# Patient Record
Sex: FEMALE | Race: WHITE | Employment: OTHER | URBAN - METROPOLITAN AREA
[De-identification: names, ages, dates, MRNs, and addresses within clinical notes are randomized per-mention and may not be internally consistent; named-entity substitution may affect disease eponyms.]

---

## 2021-04-26 ENCOUNTER — OFFICE VISIT (OUTPATIENT)
Dept: NEUROLOGY | Age: 26
End: 2021-04-26
Payer: MEDICAID

## 2021-04-26 VITALS — RESPIRATION RATE: 20 BRPM | SYSTOLIC BLOOD PRESSURE: 118 MMHG | DIASTOLIC BLOOD PRESSURE: 88 MMHG

## 2021-04-26 DIAGNOSIS — R41.89 COGNITIVE IMPAIRMENT: Primary | ICD-10-CM

## 2021-04-26 DIAGNOSIS — F80.81 STUTTERING: ICD-10-CM

## 2021-04-26 DIAGNOSIS — F41.9 ANXIETY AND DEPRESSION: ICD-10-CM

## 2021-04-26 DIAGNOSIS — R20.2 PARESTHESIA: ICD-10-CM

## 2021-04-26 DIAGNOSIS — M54.81 BILATERAL OCCIPITAL NEURALGIA: ICD-10-CM

## 2021-04-26 DIAGNOSIS — R53.82 CHRONIC FATIGUE: ICD-10-CM

## 2021-04-26 DIAGNOSIS — R29.2 HYPERREFLEXIA: ICD-10-CM

## 2021-04-26 DIAGNOSIS — F32.A ANXIETY AND DEPRESSION: ICD-10-CM

## 2021-04-26 DIAGNOSIS — G90.A POTS (POSTURAL ORTHOSTATIC TACHYCARDIA SYNDROME): ICD-10-CM

## 2021-04-26 PROCEDURE — 99205 OFFICE O/P NEW HI 60 MIN: CPT | Performed by: PSYCHIATRY & NEUROLOGY

## 2021-04-26 RX ORDER — METFORMIN HYDROCHLORIDE 500 MG/1
1500 TABLET ORAL DAILY
COMMUNITY

## 2021-04-26 RX ORDER — ALPRAZOLAM 0.25 MG/1
0.25 TABLET ORAL AS NEEDED
COMMUNITY

## 2021-04-26 RX ORDER — LISINOPRIL 20 MG/1
20 TABLET ORAL DAILY
COMMUNITY

## 2021-04-26 NOTE — PROGRESS NOTES
NEUROLOGY CLINIC NOTE    Patient ID:  Luci Springer  [id number]  87 y.o.  1995    Date of Consultation:  April 26, 2021    Reason for Consultation: question of multiple sclerosis    Chief Complaint   Patient presents with    New Patient     MS? History of Present Illness: There are no active problems to display for this patient. Past Medical History:   Diagnosis Date    Anxiety     Depression     High cholesterol     Hypertension     NAFL (nonalcoholic fatty liver)     PCOS (polycystic ovarian syndrome)     POTS (postural orthostatic tachycardia syndrome)       Past Surgical History:   Procedure Laterality Date    HX WISDOM TEETH EXTRACTION        Prior to Admission medications    Medication Sig Start Date End Date Taking? Authorizing Provider   fludrocortisone acetate (FLUDROCORTISONE PO) Take 0.1 mg by mouth daily. Yes Provider, Historical   METOPROLOL SUCCINATE PO Take 25 mg by mouth daily. Yes Provider, Historical   BUPROPION HCL PO Take 450 mg by mouth daily. Yes Provider, Historical   fluoxetine HCl (FLUOXETINE PO) Take 80 mg by mouth daily. Yes Provider, Historical   ALPRAZolam (XANAX) 0.25 mg tablet Take 0.25 mg by mouth as needed for Anxiety. Yes Provider, Historical   metFORMIN (GLUCOPHAGE) 500 mg tablet Take 1,500 mg by mouth daily. Yes Provider, Historical   colesevelam HCl (COLESEVELAM PO) Take 625 mg by mouth two (2) times a day. Yes Provider, Historical   naltrexone HCl (NALTREXONE PO) Take 4.5 mg by mouth daily. Yes Provider, Historical   lisinopriL (PRINIVIL, ZESTRIL) 20 mg tablet Take 20 mg by mouth daily. Yes Provider, Historical   semaglutide (OZEMPIC SC) by SubCUTAneous route.    Yes Provider, Historical     Allergies   Allergen Reactions    Sulfa (Sulfonamide Antibiotics) Rash      Social History     Tobacco Use    Smoking status: Not on file   Substance Use Topics    Alcohol use: Not on file      Family History   Problem Relation Age of Onset    Heart Disease Father     Hypertension Sister     Depression Sister     Anxiety Sister     Bipolar Disorder Sister     Heart Disease Other         Subjective:      Juice Sahh is a 30 y.o. obese RHWF with history of anxiety, depression, OCD, PTSD, PCOS, HTN, hypercholesterolemia and POTS who is here for further evaluation for a concern for multiple sclerosis. Patient is being seen by Dr. Lionel Ruelas for POTS. She is also under the care of psychiatrist and therapist. Previously saw a neurologist back in high school for joint and whole body pain and difficulty holding on to things. Patient provided a list of her concerns that has been ongoing for months to years:  Patient complaining of exercise and heat intolerance  Shortness of breath with activity  Electrical sensation base of the neck and shoots up in the head  Rapid heartbeat  Dizziness precipitated with her head tilted back  Seeing white spots in her field of vision or sparkles or bigger black spots. Hallucinations around sleep. Sense of weakness. Restless legs. Stuttering when speaking. Sensitivity to light, sound, touch and smells. 90 pound weight gain in 2 years. Episodes of numbing sensation in the tongue. Feels like the ground is tilted sideways sometimes. Issues with difficulty remembering things. Periods of confusion. Spells when she does not recognize faces, numbers her words. Feels like she is in a fog. Full feeling in her throat and tongue and sometimes hard to swallow. Joint pain. Outside reports reviewed: none.     Review of Systems:    A comprehensive review of systems was performed:   Constitutional: positive for weight gain,fatigue  Eyes: positive for vision problems  Ears, nose, mouth, throat, and face: positive for none  Respiratory: positive for shortness of breath  Cardiovascular: positive for high blood pressure  Gastrointestinal: positive for none  Genitourinary: positive for none  Integument/breast: positive for none  Hematologic/lymphatic: positive for none  Musculoskeletal: positive for weakness, joint pain  Neurological: positive for memory loss  Behavioral/Psych: positive for anxiety, depression  Endocrine: positive for none  Allergic/Immunologic: positive for none      Objective:     Visit Vitals  /88   Resp 20       PHYSICAL EXAM:    General Appearance: Alert, patient appears stated age. General:  Obese, patient in no apparent distress. Head/Face: The head is normocephalic and atraumatic. Eyes: Conjunctivae appear normal. Sclera appear normal and non-icteric. Nose (and Sinus):   No abnormality of the nose or sinuses is noted. Oral:   Throat is clear. Lymphatics:  No lymphadenopathy in the neck/head. Neck and Thyroid:   No bruits noted in the neck. Respiratory:  Lungs clear to auscultation. Cardiovascular:  Palpation and auscultation: regular rate and rhythm. Extremity: No joint swelling, erythema or pedal edema. NEUROLOGICAL EXAM:    Appearance: The patient is obese, provides a coherent history and is in no acute distress. Mental Status: Oriented to time, place and person. Fluent, no aphasia or dysarthria. Mood and affect appropriate. Cranial Nerves:   Intact visual fields. VA 20/20 OD, 20/25 OS on near vision without correction. Fundi are benign. TUNDE, EOM's full, no nystagmus, no ptosis. Facial sensation is normal. Corneal reflexes are intact. Facial movement is symmetric. Hearing is normal bilaterally. Palate is midline with normal elevation. Sternocleidomastoid and trapezius muscles are normal. Tongue is midline. Motor:  5/5 strength in upper and lower proximal and distal muscles. Normal bulk and tone. No fasciculations. No pronator drift. Reflexes:   Deep tendon reflexes 3+/4 and symmetrical. Downgoing toes. Sensory:   Normal to cold, pinprick and vibration. Gait:  Normal gait. No Romberg. Can do tandem walking. Tremor:   No tremor noted.    Cerebellar:  Intact FTN/ANEUDY/HTS. Neurovascular:  Normal heart sounds and regular rhythm, peripheral pulses intact, and no carotid bruits. Assessment:   Cognitive impairment  Paresthesia  Chronic fatigue   Occipital neuralgia  Hyperreflexia  Stuttering  POTS  Anxiety/depression    Plan:   Neurological examination mainly reveals symmetric hyperreflexia but no focal findings. Patient has multiple issues that concerning for possible central demyelinating disease process. Given her age also need to consider congenital anomalies. Brain MRI with and without contrast was ordered to further assess. Discussed possible neuropsychological testing as well. Further intervention be done pending results of testing. Patient with widespread paresthesias with no clear glove stocking sensory deficit on exam.  Need to look for central process. Patient with chronic fatigue, may be related to history of POTS as well has given her body habitus potential issues with sleep apnea. Could also be central in etiology. Patient on exam with elements of occipital neuralgia due to poor anterior head posture. Likely cause of that electrical shock feeling from the base of her neck to the top of her head. Advised to correct her anterior head posture. Use headrest at home and while driving. Use wireless headsets. Do not carry anterior shoulder. Use only one pillow when sleeping at most.    Exam reveals issues with hyperreflexia. Need to rule out central demyelinating process. Patient with intermittent issues with stuttering. Not evident currently. Need to consider central process versus psychological.    Patient with known history of POTS. Certainly the constellation of her symptoms can be explained by this. Continue care with specialist.    Patient with significant issues with anxiety and depression. May benefit from neuropsychological testing.   Continue care with psychiatrist and therapist.  All questions and concerns were answered. Visit lasted 65 minutes. Greater than 50% was spent reviewing the list that she provided over symptomatologies, discussion about her condition, potential etiology, prognosis, need for brain MRI with and without contrast to assess for possible central demyelinating disease or congenital anomalies, issues may be related to POTS, also related to her significant psychiatric issues, possible need for neuropsychological testing    This note was created using voice recognition software. Despite editing, there may be syntax errors.

## 2021-04-26 NOTE — PROGRESS NOTES
MS- electric feeling that goes in the back of her neck into the back of her head   Heat and exercise intolerance   Cognitive decline   She always has the feeling that her limbs are weak and feel heavy     Gotten worse in the last 3 years, she knows she had some of these during high school

## 2021-04-26 NOTE — LETTER
4/27/2021 Patient: Adia Watson YOB: 1995 Date of Visit: 4/26/2021 Alessandra Yoder MD 
[address] [facility] [phone] [address] Fax: [phone] Dear Alessandra Yoder MD, Thank you for referring Ms. Adia Watson to [facility] for evaluation. My notes for this consultation are attached. If you have questions, please do not hesitate to call me. I look forward to following your patient along with you. Sincerely, Fernie Toscano MD

## 2021-05-01 ENCOUNTER — HOSPITAL ENCOUNTER (OUTPATIENT)
Dept: MRI IMAGING | Age: 26
Discharge: HOME | End: 2021-05-01
Attending: PSYCHIATRY & NEUROLOGY
Payer: MEDICAID

## 2021-05-01 VITALS — BODY MASS INDEX: 41.41 KG/M2 | WEIGHT: 225 LBS | HEIGHT: 62 IN

## 2021-05-01 DIAGNOSIS — M54.81 BILATERAL OCCIPITAL NEURALGIA: ICD-10-CM

## 2021-05-01 DIAGNOSIS — R53.82 CHRONIC FATIGUE: ICD-10-CM

## 2021-05-01 DIAGNOSIS — F80.81 STUTTERING: ICD-10-CM

## 2021-05-01 DIAGNOSIS — R20.2 PARESTHESIA: ICD-10-CM

## 2021-05-01 DIAGNOSIS — R41.89 COGNITIVE IMPAIRMENT: ICD-10-CM

## 2021-05-01 DIAGNOSIS — R29.2 HYPERREFLEXIA: ICD-10-CM

## 2021-05-01 PROCEDURE — 70553 MRI BRAIN STEM W/O & W/DYE: CPT

## 2021-05-01 PROCEDURE — A9575 INJ GADOTERATE MEGLUMI 0.1ML: HCPCS | Performed by: PSYCHIATRY & NEUROLOGY

## 2021-05-01 PROCEDURE — 74011250636 HC RX REV CODE- 250/636: Performed by: PSYCHIATRY & NEUROLOGY

## 2021-05-01 RX ORDER — GADOTERATE MEGLUMINE 376.9 MG/ML
20 INJECTION INTRAVENOUS
Status: COMPLETED | OUTPATIENT
Start: 2021-05-01 | End: 2021-05-01

## 2021-05-01 RX ADMIN — GADOTERATE MEGLUMINE 20 ML: 376.9 INJECTION INTRAVENOUS at 15:47

## 2021-05-03 NOTE — PROGRESS NOTES
I called the patient and informed her that her brain MRI was normal. No abnormal enhancement. No findings consistent with multiple sclerosis.

## 2023-05-14 RX ORDER — LISINOPRIL 20 MG/1
20 TABLET ORAL DAILY
COMMUNITY

## 2023-05-14 RX ORDER — ALPRAZOLAM 0.25 MG/1
0.25 TABLET ORAL PRN
COMMUNITY